# Patient Record
Sex: FEMALE | Race: WHITE | NOT HISPANIC OR LATINO | Employment: FULL TIME | URBAN - METROPOLITAN AREA
[De-identification: names, ages, dates, MRNs, and addresses within clinical notes are randomized per-mention and may not be internally consistent; named-entity substitution may affect disease eponyms.]

---

## 2017-06-08 NOTE — PROGRESS NOTES
SUBJECTIVE:     CC: Melanie Eisenberg is an 57 year old woman who presents for preventive health visit. She is new to [place].     Healthy Habits:    Do you get at least three servings of calcium containing foods daily (dairy, green leafy vegetables, etc.)? yes and no, taking calcium and/or vitamin D supplement: yes -     Amount of exercise or daily activities, outside of work: 0 day(s) per week    Problems taking medications regularly No    Medication side effects: No    Have you had an eye exam in the past two years? yes    Do you see a dentist twice per year? yes  Do you have sleep apnea, excessive snoring or daytime drowsiness?no      Would like to get labs drawn to check on her vitamin levels  History of gastric bypass about 10 years ago.  Care everywhere reviewed.    Patient states she used to have a problem with BP, but that resolved after bypass surgery.  She has noticed a little weight gain since menopause.      Today's PHQ-2 Score:   PHQ-2 ( 1999 Pfizer) 6/16/2017   Q1: Little interest or pleasure in doing things 0   Q2: Feeling down, depressed or hopeless 0   PHQ-2 Score 0       Abuse: Current or Past(Physical, Sexual or Emotional)- No  Do you feel safe in your environment - Yes    Social History   Substance Use Topics     Smoking status: Never Smoker     Smokeless tobacco: Not on file     Alcohol use Yes      Comment: occasional     The patient does not drink >3 drinks per day nor >7 drinks per week.    Mammo Decision Support:  Patient over age 50, mutual decision to screen reflected in health maintenance.    Pertinent mammograms are reviewed under the imaging tab.  History of abnormal Pap smear: No.  Last Pap at [facility] 7/5/13 NIL, hr HPV neg.      ROS:  C: NEGATIVE for fever, chills, change in weight  I: NEGATIVE for worrisome rashes, moles or lesions  E: NEGATIVE for vision changes or irritation  ENT: NEGATIVE for ear, mouth and throat problems  R: NEGATIVE for significant cough or SOB  B:  NEGATIVE for masses, tenderness or discharge  CV: NEGATIVE for chest pain, palpitations or peripheral edema  GI: NEGATIVE for nausea, abdominal pain, heartburn, or change in bowel habits  : NEGATIVE for unusual urinary or vaginal symptoms. No vaginal bleeding.  FMP about 2014  M: NEGATIVE for significant arthralgias or myalgia  N: NEGATIVE for weakness, dizziness or paresthesias  P: NEGATIVE for changes in mood or affect     BP Readings from Last 3 Encounters:   06/16/17 (!) 139/92    Wt Readings from Last 3 Encounters:   06/16/17 233 lb 8 oz (105.9 kg)                  There is no problem list on file for this patient.    History reviewed. No pertinent surgical history.    Social History   Substance Use Topics     Smoking status: Never Smoker     Smokeless tobacco: Not on file     Alcohol use Yes      Comment: occasional     Family History   Problem Relation Age of Onset     Emphysema Mother      HEART DISEASE Mother      Rheumatoid Arthritis Mother      Dementia Father      Breast Cancer Maternal Grandmother      Thyroid Disease Maternal Grandmother      Spine Problems Sister      DIABETES Son      Thyroid Disease Daughter          Current Outpatient Prescriptions   Medication Sig Dispense Refill     cyanocobalamin 2500 MCG SUBL sublingual tablet Place 2,500 mcg under the tongue       Cholecalciferol (D 5000) 5000 UNITS TABS Take 5,000 Units by mouth       calcium citrate 250 MG TABS Take 250 mg by mouth       FERROUS FUMARATE PO        Multiple Vitamin (MULTI-VITAMINS) TABS Take 2 tablets by mouth       Biotin (SUPER BIOTIN) 5 MG TABS Take 5,000 mcg by mouth       Coenzyme Q10 (COQ-10 PO) Take 100 mg by mouth       Glucos-Chond-Hyal Ac-Ca Fructo (MOVE FREE JOINT HEALTH ADVANCE PO)        Allergies   Allergen Reactions     Food Anaphylaxis and Swelling     Shrimp - Anaphylaxis  Cucumbers- sweeling on lip area     Aspirin Hives     Codeine Hives     Diphenhydramine Hives     Nsaids Hives     Other  [No Clinical  "Screening - See Comments] Swelling     Pesticides- causes Swelling , on lips     Penicillins Hives     Salicylates Hives     Strawberry Hives     Morphine Rash     No lab results found.     OBJECTIVE:     BP (!) 139/92  Pulse 77  Ht 5' 9.75\" (1.772 m)  Wt 233 lb 8 oz (105.9 kg)  LMP  (LMP Unknown)  BMI 33.74 kg/m2     EXAM:  Gen: Alert and oriented times 3, no acute distress.  Well developed, well nourished, pleasant.    Neck: Supple, no masses.  No thyromegaly.  Breast: Symmetrical without lesions.  No dimpling, nipple discharge, or discrete masses.  No lymphadenopathy.  Chest:  Non labored.  Clear to auscultation bilaterally.    Heart: Regular, normal S1, S2.  No murmurs.   Abdomen: Soft, nontender, nondistended.  No hepatosplenomegaly.    :  Normal female external genitalia.  Hyperpigmentation, non-raised on medial aspect of labia minor bilaterally and posterior fourchette, No lesions.  Urethral meatus normal.  Speculum exam reveals a normal vaginal vault, normal cervix with non-friable small polyps, two about 1-2 mm.  No abnormal discharge.  Bimanual exam reveals a normal, mobile, nontender uterus.  No cervical motion tenderness.  Adnexa nontender with no palpable masses.    Extremities:  Nontender, no edema.    Pap obtained:  No     ASSESSMENT/PLAN:       1. Well female exam with routine gynecological exam  - GLUCOSE  - LIPID REFLEX TO DIRECT LDL PANEL    2. Screen for colon cancer  - GASTROENTEROLOGY ADULT REF PROCEDURE ONLY    3. Visit for screening mammogram  - MA SCREENING DIGITAL BILAT - Future  (s+30); Future    4. History of gastric bypass  - CBC with platelets  - Ferritin  - Iron and iron binding capacity  - **Vitamin B12   - Folate  - Calcium  - **Vitamin D     5. Family history of thyroid disease  - TSH with free T4 reflex    6. Need for hepatitis C screening test  - Hepatitis C antibody    Pap due next year.  Cervical polyps asymptomatic, continue to observe.  Monitor hyperpigmented areas on " "nancy beavers.      COUNSELING:   Reviewed preventive health counseling, as reflected in patient instructions    BP Screening:   Last 3 BP Readings:    BP Readings from Last 3 Encounters:   06/16/17 (!) 139/92       The following was recommended to the patient:   - Patient will increase diet and exercise, which has helped her in the past.      reports that she has never smoked. She does not have any smokeless tobacco history on file.    Estimated body mass index is 33.74 kg/(m^2) as calculated from the following:    Height as of this encounter: 5' 9.75\" (1.772 m).    Weight as of this encounter: 233 lb 8 oz (105.9 kg).   Weight management plan: increase lean protein, portion control, exercise.    Counseling Resources:  ATP IV Guidelines  Pooled Cohorts Equation Calculator  Breast Cancer Risk Calculator  FRAX Risk Assessment  ICSI Preventive Guidelines  Dietary Guidelines for Americans, 2010  USDA's MyPlate  ASA Prophylaxis  Lung CA Screening    Jaycee Mark MD  [facility]    "

## 2017-06-16 ENCOUNTER — OFFICE VISIT (OUTPATIENT)
Dept: OBGYN | Facility: CLINIC | Age: 57
End: 2017-06-16
Payer: COMMERCIAL

## 2017-06-16 VITALS
HEIGHT: 70 IN | WEIGHT: 233.5 LBS | HEART RATE: 77 BPM | BODY MASS INDEX: 33.43 KG/M2 | DIASTOLIC BLOOD PRESSURE: 92 MMHG | SYSTOLIC BLOOD PRESSURE: 139 MMHG

## 2017-06-16 DIAGNOSIS — Z01.419 WELL FEMALE EXAM WITH ROUTINE GYNECOLOGICAL EXAM: Primary | ICD-10-CM

## 2017-06-16 DIAGNOSIS — Z98.84 HISTORY OF GASTRIC BYPASS: ICD-10-CM

## 2017-06-16 DIAGNOSIS — Z11.59 NEED FOR HEPATITIS C SCREENING TEST: ICD-10-CM

## 2017-06-16 DIAGNOSIS — Z83.49 FAMILY HISTORY OF THYROID DISEASE: ICD-10-CM

## 2017-06-16 DIAGNOSIS — Z12.31 VISIT FOR SCREENING MAMMOGRAM: ICD-10-CM

## 2017-06-16 DIAGNOSIS — Z12.11 SCREEN FOR COLON CANCER: ICD-10-CM

## 2017-06-16 LAB
CALCIUM SERPL-MCNC: 9.1 MG/DL (ref 8.5–10.1)
CHOLEST SERPL-MCNC: 264 MG/DL
DEPRECATED CALCIDIOL+CALCIFEROL SERPL-MC: 62 UG/L (ref 20–75)
ERYTHROCYTE [DISTWIDTH] IN BLOOD BY AUTOMATED COUNT: 13.6 % (ref 10–15)
FERRITIN SERPL-MCNC: 22 NG/ML (ref 8–252)
FOLATE SERPL-MCNC: 45.2 NG/ML
GLUCOSE SERPL-MCNC: 84 MG/DL (ref 70–99)
HCT VFR BLD AUTO: 43.6 % (ref 35–47)
HCV AB SERPL QL IA: NORMAL
HDLC SERPL-MCNC: 106 MG/DL
HGB BLD-MCNC: 14.1 G/DL (ref 11.7–15.7)
IRON SATN MFR SERPL: 19 % (ref 15–46)
IRON SERPL-MCNC: 84 UG/DL (ref 35–180)
LDLC SERPL CALC-MCNC: 136 MG/DL
MCH RBC QN AUTO: 29.5 PG (ref 26.5–33)
MCHC RBC AUTO-ENTMCNC: 32.3 G/DL (ref 31.5–36.5)
MCV RBC AUTO: 91 FL (ref 78–100)
NONHDLC SERPL-MCNC: 158 MG/DL
PLATELET # BLD AUTO: 370 10E9/L (ref 150–450)
RBC # BLD AUTO: 4.78 10E12/L (ref 3.8–5.2)
TIBC SERPL-MCNC: 452 UG/DL (ref 240–430)
TRIGL SERPL-MCNC: 111 MG/DL
TSH SERPL DL<=0.005 MIU/L-ACNC: 2.14 MU/L (ref 0.4–4)
VIT B12 SERPL-MCNC: 1348 PG/ML (ref 193–986)
WBC # BLD AUTO: 4.6 10E9/L (ref 4–11)

## 2017-06-16 PROCEDURE — 82746 ASSAY OF FOLIC ACID SERUM: CPT | Performed by: OBSTETRICS & GYNECOLOGY

## 2017-06-16 PROCEDURE — 84443 ASSAY THYROID STIM HORMONE: CPT | Performed by: OBSTETRICS & GYNECOLOGY

## 2017-06-16 PROCEDURE — 83550 IRON BINDING TEST: CPT | Performed by: OBSTETRICS & GYNECOLOGY

## 2017-06-16 PROCEDURE — 82728 ASSAY OF FERRITIN: CPT | Performed by: OBSTETRICS & GYNECOLOGY

## 2017-06-16 PROCEDURE — 82306 VITAMIN D 25 HYDROXY: CPT | Performed by: OBSTETRICS & GYNECOLOGY

## 2017-06-16 PROCEDURE — 82310 ASSAY OF CALCIUM: CPT | Performed by: OBSTETRICS & GYNECOLOGY

## 2017-06-16 PROCEDURE — 36415 COLL VENOUS BLD VENIPUNCTURE: CPT | Performed by: OBSTETRICS & GYNECOLOGY

## 2017-06-16 PROCEDURE — 82607 VITAMIN B-12: CPT | Performed by: OBSTETRICS & GYNECOLOGY

## 2017-06-16 PROCEDURE — 99386 PREV VISIT NEW AGE 40-64: CPT | Performed by: OBSTETRICS & GYNECOLOGY

## 2017-06-16 PROCEDURE — 86803 HEPATITIS C AB TEST: CPT | Performed by: OBSTETRICS & GYNECOLOGY

## 2017-06-16 PROCEDURE — 83540 ASSAY OF IRON: CPT | Performed by: OBSTETRICS & GYNECOLOGY

## 2017-06-16 PROCEDURE — 80061 LIPID PANEL: CPT | Performed by: OBSTETRICS & GYNECOLOGY

## 2017-06-16 PROCEDURE — 85027 COMPLETE CBC AUTOMATED: CPT | Performed by: OBSTETRICS & GYNECOLOGY

## 2017-06-16 PROCEDURE — 82947 ASSAY GLUCOSE BLOOD QUANT: CPT | Performed by: OBSTETRICS & GYNECOLOGY

## 2017-06-16 RX ORDER — PSEUDOEPHEDRINE HCL 30 MG
250 TABLET ORAL
COMMUNITY

## 2017-06-16 RX ORDER — BIOTIN 5 MG
5000 TABLET ORAL
COMMUNITY
Start: 2011-01-17

## 2017-06-16 RX ORDER — DIPHENOXYLATE HYDROCHLORIDE AND ATROPINE SULFATE 2.5; .025 MG/1; MG/1
2 TABLET ORAL
COMMUNITY

## 2017-06-16 NOTE — NURSING NOTE
"Chief Complaint   Patient presents with     Physical       Initial BP (!) 139/92  Pulse 77  Ht 5' 9.75\" (1.772 m)  Wt 233 lb 8 oz (105.9 kg)  LMP  (LMP Unknown)  BMI 33.74 kg/m2 Estimated body mass index is 33.74 kg/(m^2) as calculated from the following:    Height as of this encounter: 5' 9.75\" (1.772 m).    Weight as of this encounter: 233 lb 8 oz (105.9 kg).  BP completed using cuff size: regular    No obstetric history on file.    The following HM Due: pap smear      The following patient reported/Care Every where data was sent to:  P ABSTRACT QUALITY INITIATIVES [[id number]]  Pap smear done on this date: 4 yrs ago (approximately), by this group: [facility], results were normal.      patient has appointment for today    Alivia Mendez CMA  June 16, 2017           "

## 2017-06-16 NOTE — MR AVS SNAPSHOT
After Visit Summary   6/16/2017    Melanie Eisenberg    MRN: [id number]           Patient Information     Date Of Birth          1960        Visit Information        Provider Department      6/16/2017 7:30 AM Jaycee Mark MD [facility]        Today's Diagnoses     Well female exam with routine gynecological exam    -  1    Screen for colon cancer        Visit for screening mammogram        History of gastric bypass          Care Instructions      Preventive Health Recommendations  Female Ages 50 - 64    Yearly exam: See your health care provider every year in order to  o Review health changes.   o Discuss preventive care.    o Review your medicines if your doctor has prescribed any.      Get a Pap test every three years (unless you have an abnormal result and your provider advises testing more often).    If you get Pap tests with HPV test, you only need to test every 5 years, unless you have an abnormal result.     You do not need a Pap test if your uterus was removed (hysterectomy) and you have not had cancer.    You should be tested each year for STDs (sexually transmitted diseases) if you're at risk.     Have a mammogram every 1 to 2 years.    Have a colonoscopy at age 50, or have a yearly FIT test (stool test). These exams screen for colon cancer.      Have a cholesterol test every 5 years, or more often if advised.    Have a diabetes test (fasting glucose) every three years. If you are at risk for diabetes, you should have this test more often.     If you are at risk for osteoporosis (brittle bone disease), think about having a bone density scan (DEXA).    Shots: Get a flu shot each year. Get a tetanus shot every 10 years.    Nutrition:     Eat at least 5 servings of fruits and vegetables each day.    Eat whole-grain bread, whole-wheat pasta and brown rice instead of white grains and rice.    Talk to your provider about Calcium and Vitamin D.     Lifestyle    Exercise  at least 150 minutes a week (30 minutes a day, 5 days a week). This will help you control your weight and prevent disease.    Limit alcohol to one drink per day.    No smoking.     Wear sunscreen to prevent skin cancer.     See your dentist every six months for an exam and cleaning.    See your eye doctor every 1 to 2 years.            Follow-ups after your visit        Additional Services     GASTROENTEROLOGY ADULT REF PROCEDURE ONLY       Last Lab Result: No results found for: CR  Body mass index is 33.74 kg/(m^2).     Needed:  No  Language:  English    Patient will be contacted to schedule procedure.     Please be aware that coverage of these services is subject to the terms and limitations of your health insurance plan.  Call member services at your health plan with any benefit or coverage questions.  Any procedures must be performed at a [place] facility OR coordinated by your clinic's referral office.    Please bring the following with you to your appointment:    (1) Any X-Rays, CTs or MRIs which have been performed.  Contact the facility where they were done to arrange for  prior to your scheduled appointment.    (2) List of current medications   (3) This referral request   (4) Any documents/labs given to you for this referral                  Future tests that were ordered for you today     Open Future Orders        Priority Expected Expires Ordered    MA SCREENING DIGITAL BILAT - Future  (s+30) Routine  6/8/2018 6/16/2017            Who to contact     If you have questions or need follow up information about today's clinic visit or your schedule please contact [facility] directly at [phone].  Normal or non-critical lab and imaging results will be communicated to you by MyChart, letter or phone within 4 business days after the clinic has received the results. If you do not hear from us within 7 days, please contact the clinic through MyChart or phone. If you have a  "critical or abnormal lab result, we will notify you by phone as soon as possible.  Submit refill requests through [employer] or call your pharmacy and they will forward the refill request to us. Please allow 3 business days for your refill to be completed.          Additional Information About Your Visit        [employer] Information     [employer] lets you send messages to your doctor, view your test results, renew your prescriptions, schedule appointments and more. To sign up, go to [email or web address] . Click on \"Log in\" on the left side of the screen, which will take you to the Welcome page. Then click on \"Sign up Now\" on the right side of the page.     You will be asked to enter the access code listed below, as well as some personal information. Please follow the directions to create your username and password.     Your access code is: [email or web address]  Expires: 2017  7:54 AM     Your access code will  in 90 days. If you need help or a new code, please call your [place] clinic or [phone].        Care EveryWhere ID     This is your Care EveryWhere ID. This could be used by other organizations to access your [place] medical records  [id number]        Your Vitals Were     Pulse Height Last Period BMI (Body Mass Index)          77 5' 9.75\" (1.772 m) (LMP Unknown) 33.74 kg/m2         Blood Pressure from Last 3 Encounters:   17 (!) 139/92    Weight from Last 3 Encounters:   17 233 lb 8 oz (105.9 kg)              We Performed the Following     **Vitamin B12 FUTURE 2mo     **Vitamin D Deficiency FUTURE anytime     Calcium     CBC with platelets     Ferritin     Folate     GASTROENTEROLOGY ADULT REF PROCEDURE ONLY     GLUCOSE     Iron and iron binding capacity     LIPID REFLEX TO DIRECT LDL PANEL        Primary Care Provider    [facility]       No address on file        Thank you!     Thank you for choosing [facility]  for your care. Our goal is always to " provide you with excellent care. Hearing back from our patients is one way we can continue to improve our services. Please take a few minutes to complete the written survey that you may receive in the mail after your visit with us. Thank you!             Your Updated Medication List - Protect others around you: Learn how to safely use, store and throw away your medicines at www.disposemymeds.org.          This list is accurate as of: 6/16/17  7:54 AM.  Always use your most recent med list.                   Brand Name Dispense Instructions for use    calcium citrate 250 MG Tabs      Take 250 mg by mouth       COQ-10 PO      Take 100 mg by mouth       cyanocobalamin 2500 MCG Subl sublingual tablet      Place 2,500 mcg under the tongue       D 5000 5000 UNITS Tabs   Generic drug:  Cholecalciferol      Take 5,000 Units by mouth       FERROUS FUMARATE PO          MOVE FREE JOINT HEALTH ADVANCE PO          MULTI-VITAMINS Tabs      Take 2 tablets by mouth       SUPER BIOTIN 5 MG Tabs   Generic drug:  Biotin      Take 5,000 mcg by mouth

## 2017-06-20 ENCOUNTER — RADIANT APPOINTMENT (OUTPATIENT)
Dept: MAMMOGRAPHY | Facility: CLINIC | Age: 57
End: 2017-06-20
Attending: OBSTETRICS & GYNECOLOGY
Payer: COMMERCIAL

## 2017-06-20 DIAGNOSIS — Z12.31 VISIT FOR SCREENING MAMMOGRAM: ICD-10-CM

## 2017-06-20 PROCEDURE — G0202 SCR MAMMO BI INCL CAD: HCPCS

## 2017-06-27 ENCOUNTER — TELEPHONE (OUTPATIENT)
Dept: OBGYN | Facility: CLINIC | Age: 57
End: 2017-06-27

## 2017-06-27 NOTE — LETTER
[facility]  [address] [address] [place] [id number]  [phone]      June 28, 2017      Melanie SALO [place]  [address] [facility] [address]          Dear Ms. Eisenberg    The results of your recent lab tests. Enclosed is a copy of these results.    Dr. Mark sent you a message via [employer] as noted below:   Entered by Jaycee Mark MD at 6/18/2017  7:35 PM   Hi!     Your cholesterol is borderline elevated.  I recommend a heart healthy diet and exercise, and plan to repeat labs in a year.  Your vitamin B12 is a little on the high side.  Otherwise your labs are within normal limits.  Please let me know if you have any questions or concerns.     Thanks!   Dr. Mark       Sincerely,  Dr. Deedee Blue RN, BAN  [facility]

## 2017-06-28 NOTE — TELEPHONE ENCOUNTER
Noted below   Entered by Jaycee Mark MD at 6/18/2017  7:35 PM   Hi!     Your cholesterol is borderline elevated.  I recommend a heart healthy diet and exercise, and plan to repeat labs in a year.  Your vitamin B12 is a little on the high side.  Otherwise your labs are within normal limits.  Please let me know if you have any questions or concerns.     Thanks!   Dr. Mark     Letter and results sent to patient. Su Álvarez RN, BAN

## 2017-07-29 ENCOUNTER — HEALTH MAINTENANCE LETTER (OUTPATIENT)
Age: 57
End: 2017-07-29

## 2018-07-17 ENCOUNTER — RADIANT APPOINTMENT (OUTPATIENT)
Dept: MAMMOGRAPHY | Facility: CLINIC | Age: 58
End: 2018-07-17
Attending: OBSTETRICS & GYNECOLOGY
Payer: COMMERCIAL

## 2018-07-17 DIAGNOSIS — Z12.31 VISIT FOR SCREENING MAMMOGRAM: ICD-10-CM

## 2018-07-17 PROCEDURE — 77063 BREAST TOMOSYNTHESIS BI: CPT | Performed by: STUDENT IN AN ORGANIZED HEALTH CARE EDUCATION/TRAINING PROGRAM

## 2018-07-17 PROCEDURE — 77067 SCR MAMMO BI INCL CAD: CPT | Performed by: STUDENT IN AN ORGANIZED HEALTH CARE EDUCATION/TRAINING PROGRAM

## 2018-08-31 ENCOUNTER — OFFICE VISIT (OUTPATIENT)
Dept: OBGYN | Facility: CLINIC | Age: 58
End: 2018-08-31
Payer: COMMERCIAL

## 2018-08-31 VITALS
BODY MASS INDEX: 33.79 KG/M2 | SYSTOLIC BLOOD PRESSURE: 157 MMHG | WEIGHT: 236 LBS | HEART RATE: 75 BPM | HEIGHT: 70 IN | DIASTOLIC BLOOD PRESSURE: 102 MMHG

## 2018-08-31 DIAGNOSIS — Z01.419 WELL FEMALE EXAM WITH ROUTINE GYNECOLOGICAL EXAM: Primary | ICD-10-CM

## 2018-08-31 DIAGNOSIS — Z83.49 FAMILY HISTORY OF THYROID DISEASE: ICD-10-CM

## 2018-08-31 DIAGNOSIS — Z12.11 SPECIAL SCREENING FOR MALIGNANT NEOPLASMS, COLON: ICD-10-CM

## 2018-08-31 DIAGNOSIS — Z98.84 HISTORY OF GASTRIC BYPASS: ICD-10-CM

## 2018-08-31 DIAGNOSIS — R03.0 ELEVATED BLOOD PRESSURE READING WITHOUT DIAGNOSIS OF HYPERTENSION: ICD-10-CM

## 2018-08-31 DIAGNOSIS — Z12.4 SCREENING FOR MALIGNANT NEOPLASM OF CERVIX: ICD-10-CM

## 2018-08-31 PROCEDURE — 87624 HPV HI-RISK TYP POOLED RSLT: CPT | Performed by: OBSTETRICS & GYNECOLOGY

## 2018-08-31 PROCEDURE — 99396 PREV VISIT EST AGE 40-64: CPT | Performed by: OBSTETRICS & GYNECOLOGY

## 2018-08-31 PROCEDURE — G0145 SCR C/V CYTO,THINLAYER,RESCR: HCPCS | Performed by: OBSTETRICS & GYNECOLOGY

## 2018-08-31 NOTE — MR AVS SNAPSHOT
After Visit Summary   8/31/2018    Melanie Eisenberg    MRN: [id number]           Patient Information     Date Of Birth          1960        Visit Information        Provider Department      8/31/2018 11:00 AM Jaycee Mark MD [facility]        Today's Diagnoses     Well female exam with routine gynecological exam    -  1    Screening for malignant neoplasm of cervix        History of gastric bypass        Family history of thyroid disease        Special screening for malignant neoplasms, colon        Elevated blood pressure reading without diagnosis of hypertension          Care Instructions      Preventive Health Recommendations  Female Ages 50 - 64    Yearly exam: See your health care provider every year in order to  o Review health changes.   o Discuss preventive care.    o Review your medicines if your doctor has prescribed any.      Get a Pap test every three years (unless you have an abnormal result and your provider advises testing more often).    If you get Pap tests with HPV test, you only need to test every 5 years, unless you have an abnormal result.     You do not need a Pap test if your uterus was removed (hysterectomy) and you have not had cancer.    You should be tested each year for STDs (sexually transmitted diseases) if you're at risk.     Have a mammogram every 1 to 2 years.    Have a colonoscopy at age 50, or have a yearly FIT test (stool test). These exams screen for colon cancer.      Have a cholesterol test every 5 years, or more often if advised.    Have a diabetes test (fasting glucose) every three years. If you are at risk for diabetes, you should have this test more often.     If you are at risk for osteoporosis (brittle bone disease), think about having a bone density scan (DEXA).    Shots: Get a flu shot each year. Get a tetanus shot every 10 years.    Nutrition:     Eat at least 5 servings of fruits and vegetables each day.    Eat whole-grain  bread, whole-wheat pasta and brown rice instead of white grains and rice.    Get adequate Calcium and Vitamin D.     Lifestyle    Exercise at least 150 minutes a week (30 minutes a day, 5 days a week). This will help you control your weight and prevent disease.    Limit alcohol to one drink per day.    No smoking.     Wear sunscreen to prevent skin cancer.     See your dentist every six months for an exam and cleaning.    See your eye doctor every 1 to 2 years.            Follow-ups after your visit        Additional Services     FAMILY PRACTICE REFERRAL       Your provider has referred you to: [facility]: [facility] [phone] [email or web address]    Please be aware that coverage of these services is subject to the terms and limitations of your health insurance plan.  Call member services at your health plan with any benefit or coverage questions.      Please bring the following with you to your appointment:    (1) Any X-Rays, CTs or MRIs which have been performed.  Contact the facility where they were done to arrange for  prior to your scheduled appointment.    (2) List of current medications   (3) This referral request   (4) Any documents/labs given to you for this referral                  Follow-up notes from your care team     Return in about 1 year (around 8/31/2019) for Physical Exam.      Future tests that were ordered for you today     Open Future Orders        Priority Expected Expires Ordered    **Glucose FUTURE anytime Routine 8/31/2018 8/31/2019 8/31/2018    Lipid panel reflex to direct LDL Fasting Routine 8/31/2018 8/31/2019 8/31/2018    TSH with free T4 reflex Routine  8/31/2019 8/31/2018    CBC with platelets Routine  8/31/2019 8/31/2018    Ferritin Routine  8/31/2019 8/31/2018    **Iron and iron binding capacity FUTURE anytime Routine 8/31/2018 8/31/2019 8/31/2018    Vitamin B12 Routine  8/31/2019  "8/31/2018    Folate Routine  8/31/2019 8/31/2018    Calcium Routine  8/31/2019 8/31/2018    **Vitamin D Deficiency FUTURE anytime Routine 8/31/2018 8/31/2019 8/31/2018            Who to contact     If you have questions or need follow up information about today's clinic visit or your schedule please contact [facility] directly at [phone].  Normal or non-critical lab and imaging results will be communicated to you by [employer], letter or phone within 4 business days after the clinic has received the results. If you do not hear from us within 7 days, please contact the clinic through [employer] or phone. If you have a critical or abnormal lab result, we will notify you by phone as soon as possible.  Submit refill requests through [employer] or call your pharmacy and they will forward the refill request to us. Please allow 3 business days for your refill to be completed.          Additional Information About Your Visit        [employer] Information     [employer] gives you secure access to your electronic health record. If you see a primary care provider, you can also send messages to your care team and make appointments. If you have questions, please call your primary care clinic.  If you do not have a primary care provider, please call [phone] and they will assist you.        Care EveryWhere ID     This is your Care EveryWhere ID. This could be used by other organizations to access your [place] medical records  [id number]        Your Vitals Were     Pulse Height Last Period BMI (Body Mass Index)          75 5' 9.53\" (1.766 m) 01/01/2014 (Approximate) 34.32 kg/m2         Blood Pressure from Last 3 Encounters:   08/31/18 (!) 157/102   06/16/17 (!) 139/92    Weight from Last 3 Encounters:   08/31/18 236 lb (107 kg)   06/16/17 233 lb 8 oz (105.9 kg)              We Performed the Following     FAMILY PRACTICE REFERRAL     HPV High Risk Types DNA Cervical     Pap imaged thin layer screen with HPV - " recommended age 30 - 65        Primary Care Provider Office Phone # Fax #    [facility] [phone] [address]  [facility] [address]        Equal Access to Services     PATEL PRIDE : Meli bowen gabe Sosherry, waaxda luqadaha, qaybta kaalmada adeegyada, edel golden yo maurer. So [facility] [phone].    ATENCIÓN: Si habla español, tiene a gale disposición servicios gratuitos de asistencia lingüística. Llame al [phone].    We comply with applicable federal civil rights laws and [place] laws. We do not discriminate on the basis of race, color, national origin, age, disability, sex, sexual orientation, or gender identity.            Thank you!     Thank you for choosing [facility]  for your care. Our goal is always to provide you with excellent care. Hearing back from our patients is one way we can continue to improve our services. Please take a few minutes to complete the written survey that you may receive in the mail after your visit with us. Thank you!             Your Updated Medication List - Protect others around you: Learn how to safely use, store and throw away your medicines at www.disposemymeds.org.          This list is accurate as of 8/31/18 11:52 AM.  Always use your most recent med list.                   Brand Name Dispense Instructions for use Diagnosis    calcium citrate 250 MG Tabs      Take 250 mg by mouth        COQ-10 PO      Take 100 mg by mouth        cyanocobalamin 2500 MCG Subl sublingual tablet      Place 2,500 mcg under the tongue        D 5000 5000 units Tabs   Generic drug:  Cholecalciferol      Take 5,000 Units by mouth        FERROUS FUMARATE PO           MOVE FREE JOINT HEALTH ADVANCE PO           MULTI-VITAMINS Tabs      Take 2 tablets by mouth        SUPER BIOTIN 5 MG Tabs   Generic drug:  Biotin      Take 5,000 mcg by mouth

## 2018-08-31 NOTE — PROGRESS NOTES
SUBJECTIVE:   CC: Melanie Eisenberg is an 58 year old woman who presents for preventive health visit.     Healthy Habits:    Do you get at least three servings of calcium containing foods daily (dairy, green leafy vegetables, etc.)? no, taking calcium and/or vitamin D supplement: yes     Amount of exercise or daily activities, outside of work: 3 day(s) per week    Problems taking medications regularly No    Medication side effects: No    Have you had an eye exam in the past two years? yes    Do you see a dentist twice per year? yes    Do you have sleep apnea, excessive snoring or daytime drowsiness?no      No Concerns    Today's PHQ-2 Score:   PHQ-2 ( 1999 Pf[employer]) 8/31/2018 6/16/2017   Q1: Little interest or pleasure in doing things 0 0   Q2: Feeling down, depressed or hopeless 0 0   PHQ-2 Score 0 0       Abuse: Current or Past(Physical, Sexual or Emotional)- No  Do you feel safe in your environment - Yes    Social History   Substance Use Topics     Smoking status: Never Smoker     Smokeless tobacco: Never Used     Alcohol use Yes      Comment: occasional     If you drink alcohol do you typically have >3 drinks per day or >7 drinks per week? No                       BP Readings from Last 3 Encounters:   08/31/18 (!) 157/102   06/16/17 (!) 139/92    Wt Readings from Last 3 Encounters:   08/31/18 236 lb (107 kg)   06/16/17 233 lb 8 oz (105.9 kg)                  Patient Active Problem List   Diagnosis     Family history of thyroid disease     History of gastric bypass     Past Surgical History:   Procedure Laterality Date     LAPAROSCOPIC FELICIA-EN-Y GASTRIC BYPASS, AND CHOLECYSTECTOMY  12/21/2010       Social History   Substance Use Topics     Smoking status: Never Smoker     Smokeless tobacco: Never Used     Alcohol use Yes      Comment: occasional     Family History   Problem Relation Age of Onset     Emphysema Mother      HEART DISEASE Mother      Rheumatoid Arthritis Mother      Dementia Father      Breast Cancer  Maternal Grandmother      Thyroid Disease Maternal Grandmother      Spine Problems Sister      Diabetes Son      Thyroid Disease Daughter          Current Outpatient Prescriptions   Medication Sig Dispense Refill     Biotin (SUPER BIOTIN) 5 MG TABS Take 5,000 mcg by mouth       calcium citrate 250 MG TABS Take 250 mg by mouth       Cholecalciferol (D 5000) 5000 UNITS TABS Take 5,000 Units by mouth       Coenzyme Q10 (COQ-10 PO) Take 100 mg by mouth       cyanocobalamin 2500 MCG SUBL sublingual tablet Place 2,500 mcg under the tongue       FERROUS FUMARATE PO        Glucos-Chond-Hyal Ac-Ca Fructo (MOVE FREE JOINT HEALTH ADVANCE PO)        Multiple Vitamin (MULTI-VITAMINS) TABS Take 2 tablets by mouth       Allergies   Allergen Reactions     Food Anaphylaxis and Swelling     Shrimp - Anaphylaxis  Cucumbers- sweeling on lip area     Aspirin Hives     Codeine Hives     Diphenhydramine Hives     Nsaids Hives     Other  [No Clinical Screening - See Comments] Swelling     Pesticides- causes Swelling , on lips     Penicillins Hives     Salicylates Hives     Strawberry Hives     Morphine Rash     Recent Labs   Lab Test  06/16/17   0814   LDL  136*   HDL  106   TRIG  111   TSH  2.14        Patient over age 50, mutual decision to screen reflected in health maintenance.    Pertinent mammograms are reviewed under the imaging tab.      History of abnormal Pap smear: NO - age 30-65 PAP every 3-5 years with negative HPV co-testing recommended    ROS:  CONSTITUTIONAL: NEGATIVE for fever, chills, change in weight  INTEGUMENTARY/SKIN: NEGATIVE for worrisome rashes, moles or lesions  EYES: NEGATIVE for vision changes or irritation  ENT: NEGATIVE for ear, mouth and throat problems  RESP: NEGATIVE for significant cough or SOB  BREAST: NEGATIVE for masses, tenderness or discharge  CV: NEGATIVE for chest pain, palpitations or peripheral edema  GI: NEGATIVE for nausea, abdominal pain, heartburn, or change in bowel habits  : NEGATIVE for  "unusual urinary or vaginal symptoms. No vaginal bleeding.  MUSCULOSKELETAL: NEGATIVE for significant arthralgias or myalgia  NEURO: NEGATIVE for weakness, dizziness or paresthesias  PSYCHIATRIC: NEGATIVE for changes in mood or affect     OBJECTIVE:   BP (!) 157/102  Pulse 75  Ht 5' 9.53\" (1.766 m)  Wt 236 lb (107 kg)  LMP 01/01/2014 (Approximate)  BMI 34.32 kg/m2  EXAM:  Gen: Alert and oriented times 3, no acute distress.  Well developed, well nourished, pleasant.    Neck: Supple, no masses.  No thyromegaly.  Breast: Symmetrical without lesions.  No dimpling, nipple discharge, or discrete masses.  No lymphadenopathy.  Chest:  Non labored.  Clear to auscultation bilaterally.    Heart: Regular, normal S1, S2.  No murmurs.   Abdomen: Soft, nontender, nondistended.  No hepatosplenomegaly.    : hyperpigmented areas on labia minora as they meet posteriorly. otherwise Normal female external genitalia.  No lesions.  Urethral meatus normal.  Speculum exam reveals a normal vaginal vault, normal cervix with small polyp.  No abnormal discharge.  Bimanual exam reveals a normal, mobile, nontender uterus.  No cervical motion tenderness.  Adnexa nontender with no palpable masses.    Extremities:  Nontender, no edema.    Pap obtained:  Yes     ASSESSMENT/PLAN:       ICD-10-CM    1. Well female exam with routine gynecological exam Z01.419 **Glucose FUTURE anytime     Lipid panel reflex to direct LDL Fasting   2. Screening for malignant neoplasm of cervix Z12.4 Pap imaged thin layer screen with HPV - recommended age 30 - 65     HPV High Risk Types DNA Cervical   3. History of gastric bypass Z98.890 CBC with platelets     Ferritin     **Iron and iron binding capacity FUTURE anytime     Vitamin B12     Folate     Calcium     **Vitamin D Deficiency FUTURE anytime   4. Family history of thyroid disease Z83.49 TSH with free T4 reflex   5. Special screening for malignant neoplasms, colon Z12.11    6. Elevated blood pressure reading " "without diagnosis of hypertension R03.0 FAMILY PRACTICE REFERRAL     Cervical polyps asymptomatic, continue to observe.  Monitor hyperpigmented areas on labia minora.      COUNSELING:   Reviewed preventive health counseling, as reflected in patient instructions    BP Readings from Last 1 Encounters:   08/31/18 (!) 157/102     Estimated body mass index is 34.32 kg/(m^2) as calculated from the following:    Height as of this encounter: 5' 9.53\" (1.766 m).    Weight as of this encounter: 236 lb (107 kg).    Weight management plan: continue diet and exercise     reports that she has never smoked. She has never used smokeless tobacco.      Counseling Resources:  ATP IV Guidelines  Pooled Cohorts Equation Calculator  Breast Cancer Risk Calculator  FRAX Risk Assessment  ICSI Preventive Guidelines  Dietary Guidelines for Americans, 2010  USDA's MyPlate  ASA Prophylaxis  Lung CA Screening    Jaycee Mark MD  [facility] [place]  "

## 2018-08-31 NOTE — NURSING NOTE
"Chief Complaint   Patient presents with     Physical       Initial BP (!) 165/102 (BP Location: Right arm, Patient Position: Chair, Cuff Size: Adult Regular)  Pulse 75  Ht 5' 9.53\" (1.766 m)  Wt 236 lb (107 kg)  LMP 01/01/2014 (Approximate)  BMI 34.32 kg/m2 Estimated body mass index is 34.32 kg/(m^2) as calculated from the following:    Height as of this encounter: 5' 9.53\" (1.766 m).    Weight as of this encounter: 236 lb (107 kg).  BP completed using cuff size: regular    No obstetric history on file.    The following HM Due: pap smear  colonoscopy/FIT      The following patient reported/Care Every where data was sent to:  P ABSTRACT QUALITY INITIATIVES [[id number]]  Pap smear done on this date: 7/5/2013 (approximately), by this group: [facility], results were NIL.      patient has appointment for today    Alivia Mendez CMA  August 31, 2018                 "

## 2018-09-05 LAB
COPATH REPORT: NORMAL
PAP: NORMAL

## 2018-09-07 LAB
FINAL DIAGNOSIS: NORMAL
HPV HR 12 DNA CVX QL NAA+PROBE: NEGATIVE
HPV16 DNA SPEC QL NAA+PROBE: NEGATIVE
HPV18 DNA SPEC QL NAA+PROBE: NEGATIVE
SPECIMEN DESCRIPTION: NORMAL
SPECIMEN SOURCE CVX/VAG CYTO: NORMAL

## 2019-08-06 ENCOUNTER — ANCILLARY PROCEDURE (OUTPATIENT)
Dept: MAMMOGRAPHY | Facility: CLINIC | Age: 59
End: 2019-08-06
Attending: OBSTETRICS & GYNECOLOGY
Payer: COMMERCIAL

## 2019-08-06 DIAGNOSIS — Z12.31 VISIT FOR SCREENING MAMMOGRAM: ICD-10-CM

## 2019-08-06 PROCEDURE — 77067 SCR MAMMO BI INCL CAD: CPT | Performed by: RADIOLOGY

## 2019-08-06 PROCEDURE — 77063 BREAST TOMOSYNTHESIS BI: CPT | Performed by: RADIOLOGY

## 2019-11-04 ENCOUNTER — HEALTH MAINTENANCE LETTER (OUTPATIENT)
Age: 59
End: 2019-11-04

## 2020-08-19 ENCOUNTER — ANCILLARY PROCEDURE (OUTPATIENT)
Dept: MAMMOGRAPHY | Facility: CLINIC | Age: 60
End: 2020-08-19
Attending: OBSTETRICS & GYNECOLOGY
Payer: COMMERCIAL

## 2020-08-19 DIAGNOSIS — Z12.31 VISIT FOR SCREENING MAMMOGRAM: ICD-10-CM

## 2020-08-19 PROCEDURE — 77067 SCR MAMMO BI INCL CAD: CPT

## 2020-08-19 PROCEDURE — 77063 BREAST TOMOSYNTHESIS BI: CPT

## 2020-11-22 ENCOUNTER — HEALTH MAINTENANCE LETTER (OUTPATIENT)
Age: 60
End: 2020-11-22

## 2021-07-23 ENCOUNTER — OFFICE VISIT (OUTPATIENT)
Dept: OBGYN | Facility: CLINIC | Age: 61
End: 2021-07-23
Payer: COMMERCIAL

## 2021-07-23 VITALS
OXYGEN SATURATION: 100 % | HEART RATE: 88 BPM | WEIGHT: 241 LBS | BODY MASS INDEX: 34.5 KG/M2 | HEIGHT: 70 IN | SYSTOLIC BLOOD PRESSURE: 148 MMHG | DIASTOLIC BLOOD PRESSURE: 101 MMHG

## 2021-07-23 DIAGNOSIS — Z01.419 WELL FEMALE EXAM WITH ROUTINE GYNECOLOGICAL EXAM: Primary | ICD-10-CM

## 2021-07-23 DIAGNOSIS — Z98.84 HISTORY OF GASTRIC BYPASS: ICD-10-CM

## 2021-07-23 DIAGNOSIS — Z12.4 ENCOUNTER FOR SCREENING FOR CERVICAL CANCER: ICD-10-CM

## 2021-07-23 DIAGNOSIS — Z91.013 SHRIMP ALLERGY: ICD-10-CM

## 2021-07-23 DIAGNOSIS — Z83.49 FAMILY HISTORY OF THYROID DISEASE: ICD-10-CM

## 2021-07-23 DIAGNOSIS — Z12.11 COLON CANCER SCREENING: ICD-10-CM

## 2021-07-23 DIAGNOSIS — Z13.6 CARDIOVASCULAR SCREENING; LDL GOAL LESS THAN 160: ICD-10-CM

## 2021-07-23 PROBLEM — E78.2 ELEVATED TRIGLYCERIDES WITH HIGH CHOLESTEROL: Status: ACTIVE | Noted: 2021-07-23

## 2021-07-23 LAB
CALCIUM SERPL-MCNC: 9.5 MG/DL (ref 8.5–10.1)
CHOLEST SERPL-MCNC: 285 MG/DL
DEPRECATED CALCIDIOL+CALCIFEROL SERPL-MC: 81 UG/L (ref 20–75)
ERYTHROCYTE [DISTWIDTH] IN BLOOD BY AUTOMATED COUNT: 16 % (ref 10–15)
FASTING STATUS PATIENT QL REPORTED: YES
FOLATE SERPL-MCNC: 57.4 NG/ML
HBA1C MFR BLD: 5.6 % (ref 0–5.6)
HCT VFR BLD AUTO: 40.6 % (ref 35–47)
HDLC SERPL-MCNC: 81 MG/DL
HGB BLD-MCNC: 12.3 G/DL (ref 11.7–15.7)
IRON SATN MFR SERPL: 10 % (ref 15–46)
IRON SERPL-MCNC: 55 UG/DL (ref 35–180)
LDLC SERPL CALC-MCNC: 165 MG/DL
MCH RBC QN AUTO: 24.7 PG (ref 26.5–33)
MCHC RBC AUTO-ENTMCNC: 30.3 G/DL (ref 31.5–36.5)
MCV RBC AUTO: 82 FL (ref 78–100)
NONHDLC SERPL-MCNC: 204 MG/DL
PLATELET # BLD AUTO: 451 10E3/UL (ref 150–450)
RBC # BLD AUTO: 4.97 10E6/UL (ref 3.8–5.2)
TIBC SERPL-MCNC: 532 UG/DL (ref 240–430)
TRIGL SERPL-MCNC: 195 MG/DL
TSH SERPL DL<=0.005 MIU/L-ACNC: 3.02 MU/L (ref 0.4–4)
VIT B12 SERPL-MCNC: 1845 PG/ML (ref 193–986)
WBC # BLD AUTO: 4.3 10E3/UL (ref 4–11)

## 2021-07-23 PROCEDURE — 99396 PREV VISIT EST AGE 40-64: CPT | Performed by: OBSTETRICS & GYNECOLOGY

## 2021-07-23 PROCEDURE — 87624 HPV HI-RISK TYP POOLED RSLT: CPT | Performed by: OBSTETRICS & GYNECOLOGY

## 2021-07-23 PROCEDURE — 82310 ASSAY OF CALCIUM: CPT | Performed by: OBSTETRICS & GYNECOLOGY

## 2021-07-23 PROCEDURE — 82306 VITAMIN D 25 HYDROXY: CPT | Performed by: OBSTETRICS & GYNECOLOGY

## 2021-07-23 PROCEDURE — G0123 SCREEN CERV/VAG THIN LAYER: HCPCS | Performed by: OBSTETRICS & GYNECOLOGY

## 2021-07-23 PROCEDURE — 84443 ASSAY THYROID STIM HORMONE: CPT | Performed by: OBSTETRICS & GYNECOLOGY

## 2021-07-23 PROCEDURE — 82746 ASSAY OF FOLIC ACID SERUM: CPT | Performed by: OBSTETRICS & GYNECOLOGY

## 2021-07-23 PROCEDURE — 83550 IRON BINDING TEST: CPT | Performed by: OBSTETRICS & GYNECOLOGY

## 2021-07-23 PROCEDURE — 83036 HEMOGLOBIN GLYCOSYLATED A1C: CPT | Performed by: OBSTETRICS & GYNECOLOGY

## 2021-07-23 PROCEDURE — 85027 COMPLETE CBC AUTOMATED: CPT | Performed by: OBSTETRICS & GYNECOLOGY

## 2021-07-23 PROCEDURE — 80061 LIPID PANEL: CPT | Performed by: OBSTETRICS & GYNECOLOGY

## 2021-07-23 PROCEDURE — 36415 COLL VENOUS BLD VENIPUNCTURE: CPT | Performed by: OBSTETRICS & GYNECOLOGY

## 2021-07-23 PROCEDURE — 82607 VITAMIN B-12: CPT | Performed by: OBSTETRICS & GYNECOLOGY

## 2021-07-23 RX ORDER — EPINEPHRINE 0.3 MG/.3ML
0.3 INJECTION SUBCUTANEOUS ONCE
Qty: 0.3 ML | Refills: 0 | Status: SHIPPED | OUTPATIENT
Start: 2021-07-23 | End: 2021-07-23

## 2021-07-23 ASSESSMENT — MIFFLIN-ST. JEOR: SCORE: 1744.45

## 2021-07-23 NOTE — PROGRESS NOTES
SUBJECTIVE:   CC: Melanie Eisenberg is an 61 year old woman who presents for preventive health visit.       Patient has been advised of split billing requirements and indicates understanding: Yes  Healthy Habits:    Getting at least 3 servings of Calcium per day:  NO    Bi-annual eye exam:  Yes    Dental care twice a year:  Yes    Sleep apnea or symptoms of sleep apnea:  None    Diet:  Regular (no restrictions)    Frequency of exercise:  None    Duration of exercise:  N/A    Taking medications regularly:  No    Barriers to taking medications:  Not applicable    Medication side effects:  Not applicable    PHQ-2 Total Score:    Additional concerns today:  No       - no history of GDM.  History of blood pressure problems with pregnancy.        Today's PHQ-2 Score:   PHQ-2 (  [employer]) 2018   Q1: Little interest or pleasure in doing things 0   Q2: Feeling down, depressed or hopeless 0   PHQ-2 Score 0       Abuse: Current or Past (Physical, Sexual or Emotional) - No  Do you feel safe in your environment? Yes    Have you ever done Advance Care Planning? (For example, a Health Directive, POLST, or a discussion with a medical provider or your loved ones about your wishes): No, advance care planning information given to patient to review.  Patient declined advance care planning discussion at this time.    Social History     Tobacco Use     Smoking status: Never Smoker     Smokeless tobacco: Never Used   Substance Use Topics     Alcohol use: Yes     Comment: occasional     If you drink alcohol do you typically have >3 drinks per day or >7 drinks per week? No    No flowsheet data found.      BP Readings from Last 3 Encounters:   21 (!) 148/101   18 (!) 157/102   17 (!) 139/92    Wt Readings from Last 3 Encounters:   21 109.3 kg (241 lb)   18 107 kg (236 lb)   17 105.9 kg (233 lb 8 oz)                  Patient Active Problem List   Diagnosis     Family history of thyroid  disease     History of gastric bypass     Past Surgical History:   Procedure Laterality Date     LAPAROSCOPIC FELICIA-EN-Y GASTRIC BYPASS, AND CHOLECYSTECTOMY  12/21/2010       Social History     Tobacco Use     Smoking status: Never Smoker     Smokeless tobacco: Never Used   Substance Use Topics     Alcohol use: Yes     Comment: occasional     Family History   Problem Relation Age of Onset     Emphysema Mother      Heart Disease Mother      Rheumatoid Arthritis Mother      Dementia Father      Breast Cancer Maternal Grandmother      Thyroid Disease Maternal Grandmother      Spine Problems Sister      Diabetes Son      Thyroid Disease Daughter          Current Outpatient Medications   Medication Sig Dispense Refill     Biotin (SUPER BIOTIN) 5 MG TABS Take 5,000 mcg by mouth       calcium citrate 250 MG TABS Take 250 mg by mouth       Cholecalciferol (D 5000) 5000 UNITS TABS Take 5,000 Units by mouth       Coenzyme Q10 (COQ-10 PO) Take 100 mg by mouth       cyanocobalamin 2500 MCG SUBL sublingual tablet Place 2,500 mcg under the tongue       EPINEPHrine (ANY BX GENERIC EQUIV) 0.3 MG/0.3ML injection 2-pack Inject 0.3 mLs (0.3 mg) into the muscle once for 1 dose 0.3 mL 0     Glucos-Chond-Hyal Ac-Ca Fructo (MOVE FREE JOINT HEALTH ADVANCE PO)        Multiple Vitamin (MULTI-VITAMINS) TABS Take 2 tablets by mouth       FERROUS FUMARATE PO  (Patient not taking: Reported on 7/23/2021)       Allergies   Allergen Reactions     Food Anaphylaxis and Swelling     Shrimp - Anaphylaxis  Cucumbers- sweeling on lip area     Aspirin Hives     Codeine Hives     Diphenhydramine Hives     Nsaids Hives     Other  [No Clinical Screening - See Comments] Swelling     Pesticides- causes Swelling , on lips     Penicillins Hives     Salicylates Hives     Strawberry Hives     Morphine Rash     Recent Labs   Lab Test 06/16/17  0814   *      TRIG 111   TSH 2.14        Mammogram Screening: Recommended mammography every 1-2 years with  "patient discussion and risk factor consideration  Pertinent mammograms are reviewed under the imaging tab.    History of abnormal Pap smear: NO - age 30-65 PAP every 5 years with negative HPV co-testing recommended  PAP / HPV Latest Ref Rng & Units 8/31/2018   PAP (Historical) - NIL   HPV16 NEG:Negative Negative   HPV18 NEG:Negative Negative   HRHPV NEG:Negative Negative       Review of Systems  CONSTITUTIONAL: NEGATIVE for fever, chills, change in weight  INTEGUMENTARY/SKIN: NEGATIVE for worrisome rashes, moles or lesions  EYES: NEGATIVE for vision changes or irritation  ENT: NEGATIVE for ear, mouth and throat problems  RESP: NEGATIVE for significant cough or SOB  BREAST: NEGATIVE for masses, tenderness or discharge  CV: NEGATIVE for chest pain, palpitations or peripheral edema  GI: NEGATIVE for nausea, abdominal pain, heartburn, or change in bowel habits  : NEGATIVE for unusual urinary or vaginal symptoms. No vaginal bleeding.  MUSCULOSKELETAL: NEGATIVE for significant arthralgias or myalgia  NEURO: NEGATIVE for weakness, dizziness or paresthesias  PSYCHIATRIC: NEGATIVE for changes in mood or affect      OBJECTIVE:   BP (!) 148/101 (BP Location: Right arm, Cuff Size: Adult Large)   Pulse 88   Ht 1.772 m (5' 9.75\")   Wt 109.3 kg (241 lb)   LMP 01/01/2014 (Approximate)   SpO2 100%   BMI 34.83 kg/m    Physical Exam  Gen: Alert and oriented times 3, no acute distress.  Well developed, well nourished, pleasant.    Neck: Supple, no masses.  No thyromegaly.  Breast: Symmetrical without lesions.  No dimpling, nipple discharge, or discrete masses.  No lymphadenopathy.  Chest:  Non labored.  Clear to auscultation bilaterally.    Heart: Regular, normal S1, S2.  No murmurs.   Abdomen: Soft, nontender, nondistended.  No hepatosplenomegaly.    :  Hyperpigmented macules labia bilaterally.   Urethral meatus normal.  Speculum exam reveals a normal vaginal vault, normal cervix with benign appearing polyp, stable from 3 " "years ago and not friable.  No abnormal discharge.  Bimanual exam reveals a normal, mobile, nontender uterus.  No cervical motion tenderness.  Adnexa nontender with no palpable masses.    Extremities:  Nontender, no edema.    Pap obtained:  Yes     ASSESSMENT/PLAN:       ICD-10-CM    1. Well female exam with routine gynecological exam  Z01.419    2. Encounter for screening for cervical cancer   Z12.4 Pap imaged thin layer screen with HPV - recommended age 30 - 65 years   3. History of gastric bypass  Z98.84 CBC with platelets     Iron and iron binding capacity     Vitamin B12     Folate     Calcium     Vitamin D Deficiency   4. Family history of thyroid disease  Z83.49 TSH with free T4 reflex   5. Colon cancer screening  Z12.11 Fecal colorectal cancer screen (FIT)   6. CARDIOVASCULAR SCREENING; LDL GOAL LESS THAN 160  Z13.6 Hemoglobin A1c     Lipid panel reflex to direct LDL Fasting   7. Shrimp allergy  Z91.013 EPINEPHrine (ANY BX GENERIC EQUIV) 0.3 MG/0.3ML injection 2-pack     Blood pressures have been normal per patient when not in clinic, but recommend she see family medicine. Also recommend she see family medicine for hives.  She states those are getting better.  She is requesting EpiPen for shrimp allergy and this was provided.    Recommend she return for biopsy of the vulva.  It has been 3 years since her vulva was examined, but they do seem to be quite hyperpigmented and more than I remember.      COUNSELING:  Reviewed preventive health counseling, as reflected in patient instructions    Estimated body mass index is 34.83 kg/m  as calculated from the following:    Height as of this encounter: 1.772 m (5' 9.75\").    Weight as of this encounter: 109.3 kg (241 lb).    Weight management plan: Discussed healthy diet and exercise guidelines    She reports that she has never smoked. She has never used smokeless tobacco.      Counseling Resources:  ATP IV Guidelines  Pooled Cohorts Equation Calculator  Breast Cancer " Risk Calculator  BRCA-Related Cancer Risk Assessment: FHS-7 Tool  FRAX Risk Assessment  ICSI Preventive Guidelines  Dietary Guidelines for Americans, 2010  [employer]'s MyPlate  ASA Prophylaxis  Lung CA Screening    Jaycee Mark MD  [facility]

## 2021-07-28 LAB
BKR LAB AP GYN ADEQUACY: NORMAL
BKR LAB AP GYN INTERPRETATION: NORMAL
BKR LAB AP HPV REFLEX: NORMAL
BKR LAB AP PREVIOUS ABNORMAL: NORMAL
PATH REPORT.COMMENTS IMP SPEC: NORMAL
PATH REPORT.RELEVANT HX SPEC: NORMAL

## 2021-07-29 LAB
HUMAN PAPILLOMA VIRUS 16 DNA: NEGATIVE
HUMAN PAPILLOMA VIRUS 18 DNA: NEGATIVE
HUMAN PAPILLOMA VIRUS FINAL DIAGNOSIS: NORMAL
HUMAN PAPILLOMA VIRUS OTHER HR: NEGATIVE

## 2021-08-12 ENCOUNTER — TELEPHONE (OUTPATIENT)
Dept: OBGYN | Facility: OTHER | Age: 61
End: 2021-08-12

## 2021-08-12 NOTE — TELEPHONE ENCOUNTER
[facility] Call Center    Phone Message    May a detailed message be left on voicemail: yes     Reason for Call: Other: pt calling about her results and is still waiting for her results to show up in the mail, please advise with her     Action Taken: Message routed to:  [facility] p [phone]    Travel Screening: Not Applicable

## 2021-08-12 NOTE — LETTER
August 12, 2021      Melanie Eisenberg  [address] [place] [address]        Dear Melanie,     This letter is to inform you of your recent lab results. Dr. Mark states that your triglycerides and cholesterol are elevated.  She recommends that you see a family medicine physician to discuss this in more detail. The rest of your labs are otherwise within normal limits.  Your Pap/HPV testing were also normal and negative. It is recommended that you have your next Pap smear and Human Papillomavirus (HPV) test in 5 years. You will also need to return to the clinic every year for an annual wellness visit.    Please let us know if you have any further questions or concerns.    Thank you,  [facility]

## 2021-08-12 NOTE — TELEPHONE ENCOUNTER
Spoke with patient - informed her of lab results and did send a letter per her request.  Gail Soria, CMA

## 2021-08-23 ENCOUNTER — OFFICE VISIT (OUTPATIENT)
Dept: OBGYN | Facility: CLINIC | Age: 61
End: 2021-08-23
Payer: COMMERCIAL

## 2021-08-23 ENCOUNTER — ANCILLARY PROCEDURE (OUTPATIENT)
Dept: MAMMOGRAPHY | Facility: CLINIC | Age: 61
End: 2021-08-23
Attending: OBSTETRICS & GYNECOLOGY
Payer: COMMERCIAL

## 2021-08-23 VITALS
HEART RATE: 80 BPM | DIASTOLIC BLOOD PRESSURE: 97 MMHG | BODY MASS INDEX: 34.83 KG/M2 | WEIGHT: 241 LBS | OXYGEN SATURATION: 100 % | SYSTOLIC BLOOD PRESSURE: 188 MMHG

## 2021-08-23 DIAGNOSIS — Z12.31 VISIT FOR SCREENING MAMMOGRAM: ICD-10-CM

## 2021-08-23 DIAGNOSIS — N90.89 VULVAR LESION: Primary | ICD-10-CM

## 2021-08-23 DIAGNOSIS — Z23 NEED FOR VACCINATION: ICD-10-CM

## 2021-08-23 PROCEDURE — 88341 IMHCHEM/IMCYTCHM EA ADD ANTB: CPT | Performed by: PATHOLOGY

## 2021-08-23 PROCEDURE — 77063 BREAST TOMOSYNTHESIS BI: CPT | Mod: GC | Performed by: RADIOLOGY

## 2021-08-23 PROCEDURE — 88305 TISSUE EXAM BY PATHOLOGIST: CPT | Performed by: PATHOLOGY

## 2021-08-23 PROCEDURE — 77067 SCR MAMMO BI INCL CAD: CPT | Mod: GC | Performed by: RADIOLOGY

## 2021-08-23 PROCEDURE — 90715 TDAP VACCINE 7 YRS/> IM: CPT | Performed by: OBSTETRICS & GYNECOLOGY

## 2021-08-23 PROCEDURE — 90471 IMMUNIZATION ADMIN: CPT | Performed by: OBSTETRICS & GYNECOLOGY

## 2021-08-23 PROCEDURE — 56605 BIOPSY OF VULVA/PERINEUM: CPT | Performed by: OBSTETRICS & GYNECOLOGY

## 2021-08-23 PROCEDURE — 88342 IMHCHEM/IMCYTCHM 1ST ANTB: CPT | Performed by: PATHOLOGY

## 2021-08-23 NOTE — PROGRESS NOTES
OB/GYN      NAME:  Melanie Eisenberg  PCP:  Clinic, [facility]  MRN:  [id number]    Impression / Plan     61 year old  with:      ICD-10-CM    1. Vulvar lesion  N90.89 BIOPSY VULVA/PERINEUM, ONE LESION     Surgical Pathology Exam   2. Need for vaccination  Z23 TDAP VACCINE (Adacel, Boostrix)  [[id number]]       Punch biopsy done today, see below.  I will contact her with the results.  Her MyChart does not work, so we will call her.  Okay to leave message.    Chief Complaint     Chief Complaint   Patient presents with     Procedure     Vulvar biopsy        HPI     Melanie Eisenberg is a  61 year old female who is seen for biopsy of vulvar lesion.  She was recently seen for her routine health maintenance exam.  She had macules at the posterior fourchette and inner labia bilaterally.  Biopsy was recommended.  She is here today for this.  No new concerns.     Patient's last menstrual period was 2014 (approximate).         Problem List     Patient Active Problem List    Diagnosis Date Noted     Elevated triglycerides with high cholesterol 2021     Priority: Medium     Family history of thyroid disease 2018     Priority: Medium     History of gastric bypass 2018     Priority: Medium       Medications     Current Outpatient Medications   Medication     Biotin (SUPER BIOTIN) 5 MG TABS     calcium citrate 250 MG TABS     Cholecalciferol (D 5000) 5000 UNITS TABS     Coenzyme Q10 (COQ-10 PO)     cyanocobalamin 2500 MCG SUBL sublingual tablet     FERROUS FUMARATE PO     Glucos-Chond-Hyal Ac-Ca Fructo (MOVE FREE JOINT HEALTH ADVANCE PO)     Multiple Vitamin (MULTI-VITAMINS) TABS     No current facility-administered medications for this visit.        Allergies     Allergies   Allergen Reactions     Food Anaphylaxis and Swelling     Shrimp - Anaphylaxis  Cucumbers- sweeling on lip area     Aspirin Hives     Codeine Hives     Diphenhydramine Hives     Nsaids Hives     Other  [No  Clinical Screening - See Comments] Swelling     Pesticides- causes Swelling , on lips     Penicillins Hives     Salicylates Hives     Strawberry Hives     Morphine Rash       ROS     A  organ review of systems was asked and the pertinent positives and negatives are listed in the HPI.     Physical Exam   Vitals: BP (!) 188/97 (BP Location: Right arm, Cuff Size: Adult Regular)   Pulse 80   Wt 109.3 kg (241 lb)   LMP 01/01/2014 (Approximate)   SpO2 100%   BMI 34.83 kg/m      General: Comfortable, no obvious distress  : Hyperpigmented macules labia bilaterally      PROCEDURE: Biopsy    After discussing the procedure and obtaining consent the patient was prepped in the usual fashion with betadine.  The area to be biopsied was injected with local anesthetic, and a 4 mm punch biopsy was used and the specimen removed with scissors.  The area was treated with silver nitrate and hemostasis noted.  The patient tolerated it well.     Biopsy site:  Labeled vulva 5 oclock.  This is at the posterior fourchette, just to the left of midline.         Jaycee Mark MD

## 2021-08-27 LAB
PATH REPORT.COMMENTS IMP SPEC: NORMAL
PATH REPORT.COMMENTS IMP SPEC: NORMAL
PATH REPORT.FINAL DX SPEC: NORMAL
PATH REPORT.GROSS SPEC: NORMAL
PATH REPORT.MICROSCOPIC SPEC OTHER STN: NORMAL
PATH REPORT.RELEVANT HX SPEC: NORMAL
PHOTO IMAGE: NORMAL

## 2022-08-25 ENCOUNTER — ANCILLARY PROCEDURE (OUTPATIENT)
Dept: MAMMOGRAPHY | Facility: CLINIC | Age: 62
End: 2022-08-25
Attending: OBSTETRICS & GYNECOLOGY
Payer: COMMERCIAL

## 2022-08-25 DIAGNOSIS — Z12.31 VISIT FOR SCREENING MAMMOGRAM: ICD-10-CM

## 2022-08-25 PROCEDURE — 77063 BREAST TOMOSYNTHESIS BI: CPT | Mod: GC | Performed by: RADIOLOGY

## 2022-08-25 PROCEDURE — 77067 SCR MAMMO BI INCL CAD: CPT | Mod: GC | Performed by: RADIOLOGY

## 2023-09-20 ENCOUNTER — ANCILLARY PROCEDURE (OUTPATIENT)
Dept: MAMMOGRAPHY | Facility: CLINIC | Age: 63
End: 2023-09-20
Attending: OBSTETRICS & GYNECOLOGY
Payer: COMMERCIAL

## 2023-09-20 DIAGNOSIS — Z12.31 VISIT FOR SCREENING MAMMOGRAM: ICD-10-CM

## 2023-09-20 PROCEDURE — 77067 SCR MAMMO BI INCL CAD: CPT | Mod: GC | Performed by: STUDENT IN AN ORGANIZED HEALTH CARE EDUCATION/TRAINING PROGRAM

## 2023-09-20 PROCEDURE — 77063 BREAST TOMOSYNTHESIS BI: CPT | Mod: GC | Performed by: STUDENT IN AN ORGANIZED HEALTH CARE EDUCATION/TRAINING PROGRAM
